# Patient Record
Sex: MALE | Race: WHITE | NOT HISPANIC OR LATINO | Employment: FULL TIME | URBAN - METROPOLITAN AREA
[De-identification: names, ages, dates, MRNs, and addresses within clinical notes are randomized per-mention and may not be internally consistent; named-entity substitution may affect disease eponyms.]

---

## 2024-01-04 PROBLEM — R73.03 PREDIABETES: Status: ACTIVE | Noted: 2024-01-04

## 2024-01-04 PROBLEM — G62.9 NEUROPATHY: Status: ACTIVE | Noted: 2019-01-15

## 2024-01-04 PROBLEM — E11.9 DIABETES (MULTI): Status: ACTIVE | Noted: 2024-01-04

## 2024-01-04 PROBLEM — Z86.711 HISTORY OF PULMONARY EMBOLUS (PE): Status: ACTIVE | Noted: 2019-01-15

## 2024-01-04 PROBLEM — H93.13 TINNITUS OF BOTH EARS: Status: ACTIVE | Noted: 2024-01-04

## 2024-01-04 PROBLEM — J34.3 HYPERTROPHY OF NASAL TURBINATES: Status: ACTIVE | Noted: 2023-05-30

## 2024-01-04 PROBLEM — T31.20: Status: ACTIVE | Noted: 2018-03-14

## 2024-01-04 PROBLEM — E78.5 HYPERLIPIDEMIA ASSOCIATED WITH TYPE 2 DIABETES MELLITUS (MULTI): Status: ACTIVE | Noted: 2022-11-10

## 2024-01-04 PROBLEM — H90.3 SENSORINEURAL HEARING LOSS (SNHL) OF BOTH EARS: Status: ACTIVE | Noted: 2023-05-30

## 2024-01-04 PROBLEM — I82.403 DVT OF LOWER EXTREMITY, BILATERAL (MULTI): Status: ACTIVE | Noted: 2018-04-23

## 2024-01-04 PROBLEM — H25.813 COMBINED FORMS OF AGE-RELATED CATARACT OF BOTH EYES: Chronic | Status: ACTIVE | Noted: 2021-03-03

## 2024-01-04 PROBLEM — G47.00 INSOMNIA: Status: ACTIVE | Noted: 2021-12-22

## 2024-01-04 PROBLEM — H91.20 SUDDEN HEARING LOSS: Status: ACTIVE | Noted: 2023-05-30

## 2024-01-04 PROBLEM — R09.81 NASAL CONGESTION: Status: ACTIVE | Noted: 2023-05-30

## 2024-01-04 PROBLEM — I15.2 HYPERTENSION ASSOCIATED WITH TYPE 2 DIABETES MELLITUS (MULTI): Status: ACTIVE | Noted: 2019-08-16

## 2024-01-04 PROBLEM — I26.99 PULMONARY EMBOLISM, BILATERAL (MULTI): Status: ACTIVE | Noted: 2018-04-23

## 2024-01-04 PROBLEM — J32.9 CHRONIC SINUSITIS: Status: ACTIVE | Noted: 2019-01-15

## 2024-01-04 PROBLEM — J34.2 DEVIATED NASAL SEPTUM: Status: ACTIVE | Noted: 2023-05-30

## 2024-01-04 PROBLEM — H52.203 HYPEROPIA OF BOTH EYES WITH ASTIGMATISM AND PRESBYOPIA: Chronic | Status: ACTIVE | Noted: 2021-03-03

## 2024-01-04 PROBLEM — T24.309A: Status: ACTIVE | Noted: 2018-03-19

## 2024-01-04 PROBLEM — H61.23 BILATERAL IMPACTED CERUMEN: Status: ACTIVE | Noted: 2023-05-30

## 2024-01-04 PROBLEM — E11.9 DIABETES MELLITUS (MULTI): Status: ACTIVE | Noted: 2024-01-04

## 2024-01-04 PROBLEM — K63.5 POLYP OF COLON: Status: ACTIVE | Noted: 2021-02-11

## 2024-01-04 PROBLEM — H52.4 HYPEROPIA OF BOTH EYES WITH ASTIGMATISM AND PRESBYOPIA: Chronic | Status: ACTIVE | Noted: 2021-03-03

## 2024-01-04 PROBLEM — H93.19 TINNITUS: Status: ACTIVE | Noted: 2023-05-30

## 2024-01-04 PROBLEM — E11.59 HYPERTENSION ASSOCIATED WITH TYPE 2 DIABETES MELLITUS (MULTI): Status: ACTIVE | Noted: 2019-08-16

## 2024-01-04 PROBLEM — E11.9 DIABETES MELLITUS TYPE 2 WITHOUT RETINOPATHY (MULTI): Chronic | Status: ACTIVE | Noted: 2022-03-07

## 2024-01-04 PROBLEM — H52.03 HYPEROPIA OF BOTH EYES WITH ASTIGMATISM AND PRESBYOPIA: Chronic | Status: ACTIVE | Noted: 2021-03-03

## 2024-01-04 PROBLEM — H81.10 BENIGN PAROXYSMAL POSITIONAL VERTIGO: Status: ACTIVE | Noted: 2023-05-30

## 2024-01-04 PROBLEM — H02.834 DERMATOCHALASIS OF BOTH UPPER EYELIDS: Status: ACTIVE | Noted: 2022-03-07

## 2024-01-04 PROBLEM — E11.69 HYPERLIPIDEMIA ASSOCIATED WITH TYPE 2 DIABETES MELLITUS (MULTI): Status: ACTIVE | Noted: 2022-11-10

## 2024-01-04 PROBLEM — H02.831 DERMATOCHALASIS OF BOTH UPPER EYELIDS: Status: ACTIVE | Noted: 2022-03-07

## 2024-01-04 PROBLEM — F34.1 DYSTHYMIA: Status: ACTIVE | Noted: 2021-08-23

## 2024-01-04 PROBLEM — I82.611: Status: ACTIVE | Noted: 2018-04-23

## 2024-01-04 PROBLEM — K57.30 DIVERTICULOSIS OF COLON: Status: ACTIVE | Noted: 2021-02-11

## 2024-01-04 RX ORDER — DICLOFENAC SODIUM 10 MG/G
4 GEL TOPICAL 2 TIMES DAILY
COMMUNITY
Start: 2022-07-08

## 2024-01-04 RX ORDER — PHENYLEPHRINE HCL 10 MG
TABLET ORAL
COMMUNITY
Start: 2022-01-07

## 2024-01-04 RX ORDER — MULTIVIT-MIN/FA/LYCOPEN/LUTEIN .4-300-25
TABLET ORAL
COMMUNITY
Start: 2021-07-06

## 2024-01-04 RX ORDER — WARFARIN 7.5 MG/1
TABLET ORAL
COMMUNITY
Start: 2018-04-25 | End: 2024-01-09 | Stop reason: ALTCHOICE

## 2024-01-04 RX ORDER — WARFARIN 10 MG/1
10 TABLET ORAL 3 TIMES WEEKLY
COMMUNITY
Start: 2018-04-25 | End: 2024-01-09 | Stop reason: ALTCHOICE

## 2024-01-04 RX ORDER — AMLODIPINE BESYLATE 2.5 MG/1
2.5 TABLET ORAL
COMMUNITY
Start: 2023-05-15 | End: 2024-05-14

## 2024-01-04 RX ORDER — PNV NO.95/FERROUS FUM/FOLIC AC 28MG-0.8MG
100 TABLET ORAL
COMMUNITY
Start: 2023-11-02 | End: 2024-11-01

## 2024-01-04 RX ORDER — ACETAMINOPHEN 500 MG
50 TABLET ORAL
COMMUNITY
Start: 2023-11-02

## 2024-01-04 RX ORDER — ASPIRIN 81 MG/1
81 TABLET ORAL
COMMUNITY
Start: 2019-07-02

## 2024-01-04 RX ORDER — IBUPROFEN 400 MG/1
1 TABLET ORAL 3 TIMES DAILY PRN
COMMUNITY
Start: 2022-07-08

## 2024-01-07 NOTE — PROGRESS NOTES
"Subjective   Patient ID: Oneil Santiago Sr is a 68 y.o. male who presents for No chief complaint on file..  HPI  This 67-year-old gentleman seen today for recheck of his years. He is seen every 6 months for heavy cerumen buildup. He also has a history of asymmetric hearing loss right lower than left checked 1 year ago. He has had no increase in ear symptoms although baseline tinnitus has persisted. There is also been no troubles with vertigo. He has had some fluctuation in his diabetic and hypertensive medications.     Objective  BP (!) 148/92   Pulse 63   Ht 1.803 m (5' 11\")   Wt 108 kg (238 lb)   BMI 33.19 kg/m²      Current Outpatient Medications   Medication Instructions    amLODIPine (NORVASC) 2.5 mg, oral, Daily RT    aspirin 81 mg, oral, Daily RT    cholecalciferol (VITAMIN D-3) 50 mcg, oral, Daily RT    cyanocobalamin (VITAMIN B-12) 100 mcg, oral, Daily RT    diclofenac sodium (VOLTAREN) 4 g, Topical, 2 times daily    ibuprofen (IBU) 400 mg tablet 1 tablet, oral, 3 times daily PRN    melatonin-pyridoxine HCl, B6, 10-10 mg tablet, IR and ER, biphasic oral    multivitamin with minerals iron-free (Centrum Silver) oral      Physical Exam  EXAMINATION:     GENERAL NOHEMI.EARANCE: Alert, in no acute distress, normal pitch and clarity of voice, well-developed and nourished, cooperative.     HEAD/FACE: Normocephalic, atraumatic, normal facial movements and strength, no no tenderness to palpation, no lesions noted.     SKIN: Normal turgor, no raised or ulcerative lesions, warm and dry to palpation.     EYES: Extraocular motions intact, no nystagmus noted, pupils equal and reactive to light and accommodation, no conjunctivitis.     EARS: Both ears--external ear anatomy is normal without lesions, auditory canals are patent and without skin abrasions or lesions, cerumen obstruction both ears severely, see procedure note,     NOSE: Intranasally there was moderate nasal turbinate size clear mucus with no evidence for " purulence. There did not appear to be any nasal dryness.     OROPHARYNX/ORAL CAVITY: Oropharynx is not inflamed and is without lesions, mucosa of the oral cavity is intact and without lesions, tongue is midline and mobile, edentulous, TMJs are mobile     NECK: No lymphadenopathy is palpated, carotid pulses are intact, neck is supple with full range of motion, no thyroid abnormalities are noted, trachea is midline, no neck masses are palpated.     LYMPHATICS: No cervical adenopathy or supraclavicular adenopathy is palpated.     NEUROLOGIC/PSYCH; alert and oriented, cranial nerves are grossly intact, gait is without falling, no motor deficits are note    Audiogram today continues to show a moderate low-frequency hearing loss mild hearing loss at 501,000 Hz then a moderate to severe loss is noted on the right moderate to moderately severe on the left in the high tones.  Discrimination scores 84% at 65 dB on the right 96% at 60 dB on the left.  Tympanograms were normal    Patient ID: Oneil Santiago Sr is a 68 y.o. male.    Ear cerumen removal    Date/Time: 1/9/2024 8:02 AM    Performed by: Chris Arce DMD, MD  Authorized by: Chris Arce DMD, MD    Consent:     Consent obtained:  Verbal    Consent given by:  Patient    Risks discussed:  Pain and incomplete removal    Alternatives discussed:  No treatment and alternative treatment  Universal protocol:     Procedure explained and questions answered to patient or proxy's satisfaction: yes      Imaging studies available: no      Required blood products, implants, devices, and special equipment available: no      Patient identity confirmed:  Verbally with patient  Procedure details:     Location:  L ear and R ear    Procedure type: curette      Procedure type comment:  Or suction    Procedure outcomes: cerumen removed    Post-procedure details:     Inspection:  No bleeding and ear canal clear    Hearing quality:  Improved    Procedure completion:  Tolerated well, no  immediate complications  Comments:      CERUMEN REMOVAL    Consent:   the planned procedure is discussed including possible risks, benefits and alternative treatments reviewed. Verbal consent is obtained.    Indications:  obstructive cerumen is noted affecting hearing and causing discomfort.    Procedure: The ears are examined microscopically and obstructive cerumen is removed with a wax loop, suction, and forceps.    Findings: Cerumen and epithelial debris obstructs both canals. Tympanic membranes are intact and noninflamed once visualized and no infections are noted.    Post procedure: The patient tolerated the procedure well without complications.    Assessment/Plan   Problem List Items Addressed This Visit             ICD-10-CM    Bilateral impacted cerumen - Primary H61.23    Hypertrophy of nasal turbinates J34.3    Sensorineural hearing loss (SNHL) of both ears H90.3     Other Visit Diagnoses         Codes    History of diabetes mellitus     Z86.39          I discussed the physical finds with the patient feel he will continue on his 6-month checkup schedule.  If he has any problems in the interim he will contact the office.  There does not appear to be any other head neck related issues requiring treatment at this point.  I did remind him about his hearing and the need to follow that over time.    I discussed the present hearing test findings with the patient. Since the last test there has been no significant change in the hearing of individual frequencies sound. Discrimination ability remains basically unchanged. It would be advised that a yearly audiogram be done unless symptoms develop in regards to progressive loss, new onset vertigo, or changes regarding tinnitus. Avoidance of loud noise without ear protection is advised. Rehabilitation using hearing aids is advised.    He has been treated by his PCP for blood pressure and his readings were little bit high this morning he needs to follow-up with.          Chris Arce DMD, MD 01/07/24 2:04 PM

## 2024-01-09 ENCOUNTER — OFFICE VISIT (OUTPATIENT)
Dept: OTOLARYNGOLOGY | Facility: CLINIC | Age: 69
End: 2024-01-09
Payer: MEDICARE

## 2024-01-09 ENCOUNTER — CLINICAL SUPPORT (OUTPATIENT)
Dept: AUDIOLOGY | Facility: CLINIC | Age: 69
End: 2024-01-09
Payer: MEDICARE

## 2024-01-09 VITALS
SYSTOLIC BLOOD PRESSURE: 148 MMHG | BODY MASS INDEX: 33.32 KG/M2 | DIASTOLIC BLOOD PRESSURE: 92 MMHG | WEIGHT: 238 LBS | HEART RATE: 63 BPM | HEIGHT: 71 IN

## 2024-01-09 DIAGNOSIS — H93.13 TINNITUS OF BOTH EARS: ICD-10-CM

## 2024-01-09 DIAGNOSIS — H61.23 BILATERAL IMPACTED CERUMEN: ICD-10-CM

## 2024-01-09 DIAGNOSIS — H90.3 SENSORINEURAL HEARING LOSS (SNHL) OF BOTH EARS: ICD-10-CM

## 2024-01-09 DIAGNOSIS — H61.23 BILATERAL IMPACTED CERUMEN: Primary | ICD-10-CM

## 2024-01-09 DIAGNOSIS — J34.3 HYPERTROPHY OF NASAL TURBINATES: ICD-10-CM

## 2024-01-09 DIAGNOSIS — Z86.39 HISTORY OF DIABETES MELLITUS: ICD-10-CM

## 2024-01-09 DIAGNOSIS — H90.3 SENSORINEURAL HEARING LOSS (SNHL) OF BOTH EARS: Primary | ICD-10-CM

## 2024-01-09 PROCEDURE — 92557 COMPREHENSIVE HEARING TEST: CPT | Performed by: AUDIOLOGIST

## 2024-01-09 PROCEDURE — 1159F MED LIST DOCD IN RCRD: CPT | Performed by: OTOLARYNGOLOGY

## 2024-01-09 PROCEDURE — 99214 OFFICE O/P EST MOD 30 MIN: CPT | Performed by: OTOLARYNGOLOGY

## 2024-01-09 PROCEDURE — 69210 REMOVE IMPACTED EAR WAX UNI: CPT | Performed by: OTOLARYNGOLOGY

## 2024-01-09 PROCEDURE — 3077F SYST BP >= 140 MM HG: CPT | Performed by: OTOLARYNGOLOGY

## 2024-01-09 PROCEDURE — 1036F TOBACCO NON-USER: CPT | Performed by: OTOLARYNGOLOGY

## 2024-01-09 PROCEDURE — 3080F DIAST BP >= 90 MM HG: CPT | Performed by: OTOLARYNGOLOGY

## 2024-01-09 PROCEDURE — 92567 TYMPANOMETRY: CPT | Performed by: AUDIOLOGIST

## 2024-01-09 PROCEDURE — 1160F RVW MEDS BY RX/DR IN RCRD: CPT | Performed by: OTOLARYNGOLOGY

## 2024-01-09 RX ORDER — ZOLPIDEM TARTRATE 5 MG/1
TABLET ORAL
COMMUNITY
Start: 2023-08-19 | End: 2024-01-09 | Stop reason: ALTCHOICE

## 2024-01-09 NOTE — PROGRESS NOTES
[facility]  AUDIOMETRIC EVALUATION      Name:  Oneil Santiago Sr   :  1955  Age:  68 y.o.  Date of Evaluation:  24    HISTORY    Oneil Santiago Sr is seen today at the request of Chris Arce M.D., NOEMY., F.A.C.S.    EVALUATION  See scanned audiogram in Media and included at the end of this report.    RESULTS    Otoscopic Evaluation:  The patient had their ears cleaned prior to the audiometric examination.    Tympanometry:   Right Ear:  Type A, consistent with normal eardrum mobility and middle ear pressure   Left Ear:  Type A, consistent with normal eardrum mobility and middle ear pressure    Ipsilateral acoustic reflexes were not completed    Pure Tone Audiometry:    Right Ear:  mild to profound sensorineural hearing loss  Left Ear:  mild to severe sensorineural hearing loss       Speech Audiometry:    Right Ear:  good in quiet at an elevated presentation level  Left Ear:  excellent in quiet at an elevated presentation level  Speech reception thresholds were in good agreement with pure tone testing.    DISCUSSION  Results were relayed to Chris Arce M.D., NOEMY., F.A.C.S.    APPOINTMENT TIME  8:30-9:00am      Lisy Waterman  Doctor of Audiology  Licensed Audiologist

## 2024-05-08 ENCOUNTER — OFFICE VISIT (OUTPATIENT)
Dept: OTOLARYNGOLOGY | Facility: CLINIC | Age: 69
End: 2024-05-08
Payer: MEDICARE

## 2024-05-08 ENCOUNTER — CLINICAL SUPPORT (OUTPATIENT)
Dept: AUDIOLOGY | Facility: CLINIC | Age: 69
End: 2024-05-08
Payer: MEDICARE

## 2024-05-08 DIAGNOSIS — H61.23 BILATERAL IMPACTED CERUMEN: Primary | ICD-10-CM

## 2024-05-08 DIAGNOSIS — H90.3 SENSORINEURAL HEARING LOSS (SNHL) OF BOTH EARS: ICD-10-CM

## 2024-05-08 DIAGNOSIS — Z86.39 HISTORY OF DIABETES MELLITUS: ICD-10-CM

## 2024-05-08 DIAGNOSIS — J34.2 DEVIATED NASAL SEPTUM: ICD-10-CM

## 2024-05-08 PROBLEM — G47.33 OSA (OBSTRUCTIVE SLEEP APNEA): Status: ACTIVE | Noted: 2024-05-02

## 2024-05-08 PROCEDURE — 69210 REMOVE IMPACTED EAR WAX UNI: CPT | Performed by: OTOLARYNGOLOGY

## 2024-05-08 PROCEDURE — 1160F RVW MEDS BY RX/DR IN RCRD: CPT | Performed by: OTOLARYNGOLOGY

## 2024-05-08 PROCEDURE — 1159F MED LIST DOCD IN RCRD: CPT | Performed by: OTOLARYNGOLOGY

## 2024-05-08 PROCEDURE — 1036F TOBACCO NON-USER: CPT | Performed by: OTOLARYNGOLOGY

## 2024-05-08 PROCEDURE — 99213 OFFICE O/P EST LOW 20 MIN: CPT | Performed by: OTOLARYNGOLOGY

## 2024-05-08 RX ORDER — TRIAMCINOLONE ACETONIDE 1 MG/G
OINTMENT TOPICAL
COMMUNITY
Start: 2024-01-25
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
## 2024-05-08 NOTE — PROGRESS NOTES
[facility]  AUDIOMETRIC EVALUATION      Name:  Oneil Santiago Sr   :  1955  Age:  68 y.o.  Date of Evaluation:  24    HISTORY    Oneil Santiago Sr is seen today at the request of Chris Arce M.D., NOEMY., F.A.C.S.    EVALUATION   Audiogram was deferred as hearing returned to baseline following cerumen removal.    APPOINTMENT TIME  1:00-1:30pm      Lisy Waterman  Doctor of Audiology  Senior Audiologist      No images are attached to the encounter or orders placed in the encounter.

## 2024-07-10 ENCOUNTER — APPOINTMENT (OUTPATIENT)
Dept: OTOLARYNGOLOGY | Facility: CLINIC | Age: 69
End: 2024-07-10
Payer: MEDICARE

## 2024-11-07 NOTE — PROGRESS NOTES
Subjective   Patient ID: Oneil Santiago Sr is a 69 y.o. male who presents for No chief complaint on file..  HPI  69-year-old male is being seen in the office today for recheck of his ears.  He is seen at 6-month intervals due to heavy cerumen buildup that further obstructs his hearing.  He had no infections or difficulties with ear pain.  He has had no recurring sinusitis.  He does have some background sensorineural hearing loss.  Review of Systems   A 12 point ROS  has been reviewed and are negative for complaint except for what is stated in the history of present illness and /or for past medical history as noted in the EMR.    Past Medical History:   Diagnosis Date    Personal history of other diseases of the musculoskeletal system and connective tissue     History of arthritis    Personal history of other drug therapy     COVID-19 vaccine series completed    Prediabetes     Borderline diabetes          Current Outpatient Medications:     amLODIPine (Norvasc) 2.5 mg tablet, Take 1 tablet (2.5 mg) by mouth once daily., Disp: , Rfl:     aspirin 81 mg EC tablet, Take 1 tablet (81 mg) by mouth once daily., Disp: , Rfl:     cholecalciferol (Vitamin D-3) 50 mcg (2,000 unit) capsule, Take 1 capsule (50 mcg) by mouth once daily., Disp: , Rfl:     diclofenac sodium (Voltaren) 1 % gel gel, Apply 1 Application topically twice a day., Disp: , Rfl:     ibuprofen (IBU) 400 mg tablet, Take 1 tablet (400 mg) by mouth 3 times a day as needed., Disp: , Rfl:     melatonin-pyridoxine HCl, B6, 10-10 mg tablet, IR and ER, biphasic, Take by mouth., Disp: , Rfl:     multivitamin with minerals iron-free (Centrum Silver), Take by mouth., Disp: , Rfl:     triamcinolone (Kenalog) 0.1 % ointment, Apply to affected areas on L leg and L arm BID x 2 weeks  Stop using when clear. Repeat as needed for flares., Disp: , Rfl:    Allergies   Allergen Reactions    Aspirin-Acetaminophen-Caffeine Rash     Patient has no allergies to individual additives  (tyelenol, caffeine, aspirin) by themselves     Patient has no allergies to individual additives (tylenol, caffeine, aspirin) by themselves    Cetirizine Dizziness     Vertigo       There were no vitals taken for this visit.    Objective   Physical Exam  EXAMINATION:     GENERAL NOHEMI.EARANCE: Alert, in no acute distress, normal pitch and clarity of voice, well-developed and nourished, cooperative.     HEAD/FACE: Normocephalic, atraumatic, normal facial movements and strength, no no tenderness to palpation, no lesions noted.     SKIN: Normal turgor, no raised or ulcerative lesions, warm and dry to palpation.     EYES: Extraocular motions intact, no nystagmus noted, pupils equal and reactive to light and accommodation, no conjunctivitis.     EARS: Both ears--external ear anatomy is normal without lesions, auditory canals are patent and without skin abrasions or lesions, cerumen obstruction both ears severely, see procedure note,     NOSE: Intranasally there was moderate nasal turbinate size clear mucus with no evidence for purulence. There did not appear to be any nasal dryness.     OROPHARYNX/ORAL CAVITY: Oropharynx is not inflamed and is without lesions, mucosa of the oral cavity is intact and without lesions, tongue is midline and mobile, edentulous, TMJs are mobile     NECK: No lymphadenopathy is palpated, carotid pulses are intact, neck is supple with full range of motion, no thyroid abnormalities are noted, trachea is midline, no neck masses are palpated.     LYMPHATICS: No cervical adenopathy or supraclavicular adenopathy is palpated.     NEUROLOGIC/PSYCH; alert and oriented, cranial nerves are grossly intact, gait is without falling, no motor deficits are note    Patient ID: Oneil Santiago Sr is a 69 y.o. male.    Ear cerumen removal    Date/Time: 11/11/2024 8:20 AM    Performed by: Chris Arce DMD, MD  Authorized by: Chris Arce DMD, MD    Consent:     Consent obtained:  Verbal    Consent given by:   Patient    Risks discussed:  Pain and incomplete removal    Alternatives discussed:  No treatment and alternative treatment  Universal protocol:     Procedure explained and questions answered to patient or proxy's satisfaction: yes      Imaging studies available: no      Required blood products, implants, devices, and special equipment available: no      Patient identity confirmed:  Verbally with patient  Procedure details:     Location:  L ear and R ear    Procedure type: curette      Procedure type comment:  Or suction    Procedure outcomes: cerumen removed    Post-procedure details:     Inspection:  No bleeding and ear canal clear    Hearing quality:  Improved    Procedure completion:  Tolerated well, no immediate complications      Assessment/Plan   Problem List Items Addressed This Visit             ICD-10-CM    Bilateral impacted cerumen - Primary H61.23    Deviated nasal septum J34.2    Hypertrophy of nasal turbinates J34.3    Sensorineural hearing loss (SNHL) of both ears H90.3     Other Visit Diagnoses         Codes    History of diabetes mellitus     Z86.39          I discussed the findings with the patient. Do to the history of cerumen impactions, avoidance of Q tip use and water contamination is advised. Periodic check ups in the office or with the PCP are advised and if recurrent obstructions are noted a scheduled cleaning schedule will be maintained. Ear pain, otorhea, changes in hearing should be reported to the office. For some the use of debrox or baby oil can be helpful as long as sen TM is intact and not perforated.  He has had no change in his overall health.  He is aware that he should be trying to amplify his hearing but he is not comfortable yet in exploring that.  If there is any difficulties with swallowing or voice he should contact the office.  We talked again about nasal congestion and I encouraged him to use nasal saline washes along with a topical nasal steroid spray on a daily basis.   He should use purified water to mix the saline and a rinse kit.    This patient is advised to follow up with their PCP for all other health care issues and treatment. Dictation was done with dragon transcription and errors in spelling  and diction are possible.       Chris Arce DMD, MD 11/07/24 12:53 PM

## 2024-11-11 ENCOUNTER — APPOINTMENT (OUTPATIENT)
Dept: OTOLARYNGOLOGY | Facility: CLINIC | Age: 69
End: 2024-11-11
Payer: MEDICARE

## 2024-11-11 VITALS — WEIGHT: 240 LBS | HEIGHT: 71 IN | BODY MASS INDEX: 33.6 KG/M2

## 2024-11-11 DIAGNOSIS — H90.3 SENSORINEURAL HEARING LOSS (SNHL) OF BOTH EARS: ICD-10-CM

## 2024-11-11 DIAGNOSIS — J34.2 DEVIATED NASAL SEPTUM: ICD-10-CM

## 2024-11-11 DIAGNOSIS — Z86.39 HISTORY OF DIABETES MELLITUS: ICD-10-CM

## 2024-11-11 DIAGNOSIS — H61.23 BILATERAL IMPACTED CERUMEN: Primary | ICD-10-CM

## 2024-11-11 DIAGNOSIS — J34.3 HYPERTROPHY OF NASAL TURBINATES: ICD-10-CM

## 2024-11-11 PROBLEM — E11.42 TYPE 2 DIABETES MELLITUS WITH DIABETIC POLYNEUROPATHY: Status: ACTIVE | Noted: 2024-11-11

## 2024-11-11 PROCEDURE — 1159F MED LIST DOCD IN RCRD: CPT | Performed by: OTOLARYNGOLOGY

## 2024-11-11 PROCEDURE — 1036F TOBACCO NON-USER: CPT | Performed by: OTOLARYNGOLOGY

## 2024-11-11 PROCEDURE — 1160F RVW MEDS BY RX/DR IN RCRD: CPT | Performed by: OTOLARYNGOLOGY

## 2024-11-11 PROCEDURE — 69210 REMOVE IMPACTED EAR WAX UNI: CPT | Performed by: OTOLARYNGOLOGY

## 2024-11-11 PROCEDURE — 99213 OFFICE O/P EST LOW 20 MIN: CPT | Performed by: OTOLARYNGOLOGY

## 2024-11-11 PROCEDURE — 3008F BODY MASS INDEX DOCD: CPT | Performed by: OTOLARYNGOLOGY

## 2024-11-11 RX ORDER — PNV NO.95/FERROUS FUM/FOLIC AC 28MG-0.8MG
100 TABLET ORAL
COMMUNITY
Start: 2024-10-21 | End: 2025-10-21

## 2025-05-07 NOTE — PROGRESS NOTES
PATIENT ID  Oneil Santiago Sr IS A 69 y.o. male WHO PRESENTS FOR      HPI   69-year-old male is being seen in the office today for recheck of his ears. He is seen at 6-month intervals due to heavy cerumen buildup that further obstructs his hearing. He had no infections or difficulties with ear pain. He has had no recurring sinusitis. He does have some background sensorineural hearing loss.  He has been having difficulties with sleep and has been working with sleep specialist in regards to obstructive apnea.  He has troubles with mask fits partly due to his edentulous state.  He also seems by his description have problems beyond apnea with insomnia.  He did use some Debrox recently for ear debridement due to obstruction further the hearing.  His last hearing test was in January 2024 and he is due for repeat 1.      ROS    A 12 point ROS  has been reviewed and are negative for complaint except for what is stated in the history of present illness and /or for past medical history as noted in the EMR.     Medical History[1]    Current Medications[2]    Social History[3]    RX Allergies[4]     vitals were not taken for this visit.     PHYSICAL EXAM  EXAMINATION:     GENERAL NOHEMI.EARANCE: Alert, in no acute distress, normal pitch and clarity of voice, well-developed and nourished, cooperative.     HEAD/FACE: Normocephalic, atraumatic, normal facial movements and strength, no no tenderness to palpation, no lesions noted.     SKIN: Normal turgor, no raised or ulcerative lesions, warm and dry to palpation.     EYES: Extraocular motions intact, no nystagmus noted, pupils equal and reactive to light and accommodation, no conjunctivitis.     EARS: Both ears--external ear anatomy is normal without lesions, auditory canals are patent and without skin abrasions or lesions, cerumen obstruction both ears severely, see procedure note,     NOSE: Intranasally there was moderate nasal turbinate size clear mucus with no evidence for  purulence.  Nasal airway obstruction results from turbinate size and mild septal deviation.  There did not appear to be any nasal dryness.     OROPHARYNX/ORAL CAVITY: Oropharynx is not inflamed and is without lesions, mucosa of the oral cavity is intact and without lesions, tongue is midline and mobile, edentulous, TMJs are mobile     NECK: No lymphadenopathy is palpated, carotid pulses are intact, neck is supple with full range of motion, no thyroid abnormalities are noted, trachea is midline, no neck masses are palpated.     LYMPHATICS: No cervical adenopathy or supraclavicular adenopathy is palpated.     NEUROLOGIC/PSYCH; alert and oriented, cranial nerves are grossly intact, gait is without falling, no motor deficits are note    Patient ID: Oneil Santiago Sr is a 69 y.o. male.    Ear cerumen removal    Date/Time: 5/12/2025 8:34 AM    Performed by: Chris Arce DMD, MD  Authorized by: Chris Arce DMD, MD    Consent:     Consent obtained:  Verbal    Consent given by:  Patient    Risks discussed:  Pain and incomplete removal    Alternatives discussed:  No treatment and alternative treatment  Universal protocol:     Procedure explained and questions answered to patient or proxy's satisfaction: yes      Imaging studies available: no      Required blood products, implants, devices, and special equipment available: no      Patient identity confirmed:  Verbally with patient  Procedure details:     Location:  L ear and R ear    Procedure type: curette      Procedure type comment:  Or suction    Procedure outcomes: cerumen removed    Post-procedure details:     Inspection:  No bleeding and ear canal clear    Hearing quality:  Improved    Procedure completion:  Tolerated well, no immediate complications      ASSESSMENT/PLAN  Problem List Items Addressed This Visit           ICD-10-CM    Bilateral impacted cerumen - Primary H61.23    Hypertrophy of nasal turbinates J34.3    Sensorineural hearing loss (SNHL) of both  ears H90.3     Other Visit Diagnoses         Codes      History of diabetes mellitus     Z86.39      Sleep disorder     G47.9          I discussed the findings with the patient. Do to the history of cerumen impactions, avoidance of Q tip use and water contamination is advised. Periodic check ups in the office or with the PCP are advised and if recurrent obstructions are noted a scheduled cleaning schedule will be maintained. Ear pain, otorhea, changes in hearing should be reported to the office. For some the use of debrox or baby oil can be helpful as long as sen TM is intact and not perforated.  He is scheduled with audiology for an upgraded hearing test.  He will need to be contacted with results.  Based on his previous audiogram he has always been a candidate for amplification of his hearing if motivated pursue it.  He show stay well-hydrated avoid excessive salt in the diet.  He also has been identified as having vitamin D deficiency and is on supplements for that.  Because of his sleep disorder and his inability to have a comfortable mask fit he may need to question the need for dental support with a bite guard at night to help reduce the leaking of the mask.  He should also ask his sleep provider about insomnia which he by description does have which may also be influencing his success with CPAP.       [1]   Past Medical History:  Diagnosis Date    Personal history of other diseases of the musculoskeletal system and connective tissue     History of arthritis    Personal history of other drug therapy     COVID-19 vaccine series completed    Prediabetes     Borderline diabetes   [2]   Current Outpatient Medications:     amLODIPine (Norvasc) 2.5 mg tablet, Take 1 tablet (2.5 mg) by mouth once daily., Disp: , Rfl:     aspirin 81 mg EC tablet, Take 1 tablet (81 mg) by mouth once daily., Disp: , Rfl:     cholecalciferol (Vitamin D-3) 50 mcg (2,000 unit) capsule, Take 1 capsule (50 mcg) by mouth once daily., Disp: ,  Rfl:     cyanocobalamin (Vitamin B-12) 100 mcg tablet, Take 1 tablet (100 mcg) by mouth once daily., Disp: , Rfl:     diclofenac sodium (Voltaren) 1 % gel gel, Apply 1 Application topically twice a day., Disp: , Rfl:     ibuprofen (IBU) 400 mg tablet, Take 1 tablet (400 mg) by mouth 3 times a day as needed., Disp: , Rfl:     melatonin-pyridoxine HCl, B6, 10-10 mg tablet, IR and ER, biphasic, Take by mouth., Disp: , Rfl:     multivitamin with minerals iron-free (Centrum Silver), Take by mouth., Disp: , Rfl:     triamcinolone (Kenalog) 0.1 % ointment, Apply to affected areas on L leg and L arm BID x 2 weeks  Stop using when clear. Repeat as needed for flares., Disp: , Rfl:   [3]   Social History  Tobacco Use    Smoking status: Former     Current packs/day: 0.00     Types: Cigarettes     Quit date: 2005     Years since quittin.4    Smokeless tobacco: Never   Substance Use Topics    Alcohol use: Never    Drug use: Never   [4]   Allergies  Allergen Reactions    Aspirin-Acetaminophen-Caffeine Rash     Patient has no allergies to individual additives (tyelenol, caffeine, aspirin) by themselves     Patient has no allergies to individual additives (tylenol, caffeine, aspirin) by themselves    Cetirizine Dizziness     Vertigo

## 2025-05-12 ENCOUNTER — APPOINTMENT (OUTPATIENT)
Dept: OTOLARYNGOLOGY | Facility: CLINIC | Age: 70
End: 2025-05-12
Payer: MEDICARE

## 2025-05-12 DIAGNOSIS — G47.9 SLEEP DISORDER: ICD-10-CM

## 2025-05-12 DIAGNOSIS — Z86.39 HISTORY OF DIABETES MELLITUS: ICD-10-CM

## 2025-05-12 DIAGNOSIS — H90.3 SENSORINEURAL HEARING LOSS (SNHL) OF BOTH EARS: ICD-10-CM

## 2025-05-12 DIAGNOSIS — J34.3 HYPERTROPHY OF NASAL TURBINATES: ICD-10-CM

## 2025-05-12 DIAGNOSIS — H61.23 BILATERAL IMPACTED CERUMEN: Primary | ICD-10-CM

## 2025-05-21 ENCOUNTER — TELEPHONE (OUTPATIENT)
Dept: OTOLARYNGOLOGY | Facility: CLINIC | Age: 70
End: 2025-05-21

## 2025-05-21 ENCOUNTER — APPOINTMENT (OUTPATIENT)
Dept: AUDIOLOGY | Facility: CLINIC | Age: 70
End: 2025-05-21
Payer: MEDICARE

## 2025-05-21 DIAGNOSIS — H93.13 TINNITUS OF BOTH EARS: ICD-10-CM

## 2025-05-21 DIAGNOSIS — H90.3 SENSORINEURAL HEARING LOSS (SNHL) OF BOTH EARS: Primary | ICD-10-CM

## 2025-05-21 PROCEDURE — 92550 TYMPANOMETRY & REFLEX THRESH: CPT | Performed by: AUDIOLOGIST

## 2025-05-21 PROCEDURE — 92557 COMPREHENSIVE HEARING TEST: CPT | Performed by: AUDIOLOGIST

## 2025-05-21 NOTE — TELEPHONE ENCOUNTER
The audiogram done today revealed findings of a moderate hearing loss in both ears up through 2000 Hz then a more severe loss is noted right more than left in the upper frequencies of sound.  Discrimination scores are 84% at 75 dB on the right 96% at 75 dB on the left.  Air pressure measurements were normal.  Comparing this test to the one done in January 2024, no major differences were noted.   The degree of hearing loss can be improved by amplification with hearing aids which have been discussed in the past.  If input from audiology is of interest then he can be referred there for hearing aid evaluation.

## 2025-05-21 NOTE — PROGRESS NOTES
"AUDIOMETRIC EVALUATION       Name:  Oneil Santiago Sr  :  1955  Age:  69 y.o.  Date of Evaluation:  2025     HISTORY  Oneil Santiago Sr was seen today for a hearing evaluation due to decrease in hearing, more in the right ear. Reports tinnitus is louder in the left ear than right ear.    PROCEDURE:  Otoscopic Evaluation:    RIGHT: Debris in ear canal and tympanic membrane visualized.  LEFT:  Debris in ear canal and tympanic membrane visualized.    Immittance: Tympanometry (226 Hz probe tone) and Stapedial Acoustic Reflexes Thresholds (ART)(Probe ear):  RIGHT: Normal middle ear pressure, mobility, and ear canal volume. Ipsilateral ART absent 500-2000 Hz.  LEFT: Normal middle ear pressure, mobility, and ear canal volume. Ipsilateral ART absent 500-2000 Hz.    Pure Tone and Speech Audiometry:    Test Technique: Pure Tone Audiometry via TDH headphones  Test Reliability: good    RIGHT: Mild hearing loss through 1000 Hz sloping to profound essentially sensorineural hearing loss through 8000 Hz. Word Recognition score was excellent using recorded material (NU-6 10-word list ordered by difficulty).   LEFT: Mild hearing loss through 2000 Hz sloping to severe sensorineural hearing loss through 8000 Hz. Word Recognition score was poor using recorded material (NU-6 25-word list).     EVALUATION  See scanned Audiogram in \"Media\".    IMPRESSIONS:  Today's test results indicate stable hearing as compared to previous testing ().    RECOMMENDATIONS:  Continue medical follow-up with physician.  Return for audiologic assessment in conjunction with otologic care or annually.   Amplification options briefly discussed and literature provided re: hearing aids. Consider amplification pending medical clearance. Check insurance benefit for hearing aid benefits and in-network providers. To schedule an appointment for a hearing aid consultation call [phone].  Implement communication strategies (utilizing visual " cues/gestures; reducing background noise and distance from desired source; increasing light to assist with visual cues; use of clear speech).   Ringing/whooshing/thumping/sounds of the ears/head, also known as tinnitus is often a symptom of hearing loss but can be associated with something other than the ears/hearing. Tinnitus coping strategies discussed including amplification and staying in a sound enriched environment (use of a fan or white/musical sound maker at night or when tinnitus is bothersome). If tinnitus becomes bothersome, there are other FDA devices that may provide relief from tinnitus including maskers within amplification and other stimulating devices. Some individuals find benefits from cognitive behavioral therapy or tinnitus retraining therapy provided by a licensed professional.    BINDU Ware, CCC-A  Senior Clinical Audiologist    TIME: [phone]

## 2025-11-12 ENCOUNTER — APPOINTMENT (OUTPATIENT)
Dept: OTOLARYNGOLOGY | Facility: CLINIC | Age: 70
End: 2025-11-12
Payer: MEDICARE